# Patient Record
Sex: MALE | Race: WHITE | NOT HISPANIC OR LATINO | Employment: FULL TIME | URBAN - METROPOLITAN AREA
[De-identification: names, ages, dates, MRNs, and addresses within clinical notes are randomized per-mention and may not be internally consistent; named-entity substitution may affect disease eponyms.]

---

## 2017-08-08 ENCOUNTER — PATIENT OUTREACH (OUTPATIENT)
Dept: ADMINISTRATIVE | Facility: HOSPITAL | Age: 47
End: 2017-08-08

## 2017-08-08 NOTE — LETTER
August 8, 2017    George Rodriguez  [address] [facility] [id number]             [facility] [address] [place] [address]  Phone: [phone]    Dear George Rodriguez     In the spirit of maintaining your good health, our system indicates that you have not been seen in the office in over 12 months and due for a visit.     Our system indicates that you are due for the following:    Influenza Vaccine  08/01/2017       Tavo Greene MD would like you to schedule an appointment for an annual exam at your earliest convenience. If you have completed any of these health maintenance requirements at an outside facility, please contact our office so we may update your health record.    If your PRIMARY CARE PHSICIAN has changed please contact your insurance company to reflect the correct physician.    If you have any issues or need assistance in scheduling this appointment, please call the number below.     Virgil GALVEZ LPN Care Coordinator  Care Coordination Department  [facility]  [phone]

## 2017-08-08 NOTE — PROGRESS NOTES
Attempted to contact patient regarding scheduling for physical. No answer. Left a detailed voicemail message including call back number. Letter sent to patient.